# Patient Record
Sex: FEMALE | Race: WHITE
[De-identification: names, ages, dates, MRNs, and addresses within clinical notes are randomized per-mention and may not be internally consistent; named-entity substitution may affect disease eponyms.]

---

## 2020-06-02 ENCOUNTER — HOSPITAL ENCOUNTER (EMERGENCY)
Dept: HOSPITAL 56 - MW.ED | Age: 30
Discharge: HOME | End: 2020-06-02
Payer: SELF-PAY

## 2020-06-02 DIAGNOSIS — E03.9: ICD-10-CM

## 2020-06-02 DIAGNOSIS — R10.11: Primary | ICD-10-CM

## 2020-06-02 DIAGNOSIS — Z79.899: ICD-10-CM

## 2020-06-02 DIAGNOSIS — R42: ICD-10-CM

## 2020-06-02 DIAGNOSIS — F17.210: ICD-10-CM

## 2020-06-02 LAB
BUN SERPL-MCNC: 10 MG/DL (ref 7–18)
CHLORIDE SERPL-SCNC: 102 MMOL/L (ref 98–107)
CO2 SERPL-SCNC: 28.6 MMOL/L (ref 21–32)
GLUCOSE SERPL-MCNC: 85 MG/DL (ref 74–106)
LIPASE SERPL-CCNC: 89 U/L (ref 73–393)
POTASSIUM SERPL-SCNC: 3.8 MMOL/L (ref 3.5–5.1)
SODIUM SERPL-SCNC: 140 MMOL/L (ref 136–145)

## 2020-06-02 PROCEDURE — 96374 THER/PROPH/DIAG INJ IV PUSH: CPT

## 2020-06-02 PROCEDURE — 99284 EMERGENCY DEPT VISIT MOD MDM: CPT

## 2020-06-02 PROCEDURE — 96375 TX/PRO/DX INJ NEW DRUG ADDON: CPT

## 2020-06-02 PROCEDURE — 74176 CT ABD & PELVIS W/O CONTRAST: CPT

## 2020-06-02 PROCEDURE — 81003 URINALYSIS AUTO W/O SCOPE: CPT

## 2020-06-02 PROCEDURE — 76705 ECHO EXAM OF ABDOMEN: CPT

## 2020-06-02 PROCEDURE — 80053 COMPREHEN METABOLIC PANEL: CPT

## 2020-06-02 PROCEDURE — 81025 URINE PREGNANCY TEST: CPT

## 2020-06-02 PROCEDURE — 36415 COLL VENOUS BLD VENIPUNCTURE: CPT

## 2020-06-02 PROCEDURE — 83690 ASSAY OF LIPASE: CPT

## 2020-06-02 PROCEDURE — 85025 COMPLETE CBC W/AUTO DIFF WBC: CPT

## 2020-06-02 PROCEDURE — 96361 HYDRATE IV INFUSION ADD-ON: CPT

## 2020-06-02 NOTE — US
INDICATION:



Right upper quadrant pain. 



COMPARISON:



None available. 



TECHNIQUE:



Ultrasound examination of the right upper quadrant was performed. 



FINDINGS:



There is normal appearance of the gallbladder, with no sign of 

cholelithiasis or acute cholecystitis. There is no sign of gallbladder wall 

thickening or pericholecystic fluid. 



A sonographic Wood sign is not present. 



The common bile duct is normal in caliber at 3 mm. 



The pancreatic head and body were examined, and these are normal in 

appearance. 



The abdominal aorta and visualized portions of the inferior vena cava are 

normal in appearance.



The liver shows no sign of mass or contour abnormality, and there is no 

sign of ascites.



The right kidney is seen to have a small parapelvic cyst in the lower pole 

measuring 1.5 centimeters in diameter. 



IMPRESSION:



Normal right upper quadrant ultrasound aside from incidental finding of a 

simple parapelvic cyst in the right kidney.



Dictated by Kervin Barbosa MD @ Jun 2 2020  6:22PM



Signed by Dr. Kervin Barbosa @ Jun 2 2020  6:25PM

## 2020-06-02 NOTE — EDM.PDOC
<Samson Mera - Last Filed: 06/02/20 19:01>





ED HPI GENERAL MEDICAL PROBLEM





- General


Chief Complaint: General


Stated Complaint: DIZZINESS, RT SIDE PAIN


Time Seen by Provider: 06/02/20 16:51





- History of Present Illness


INITIAL COMMENTS - FREE TEXT/NARRATIVE: 





HISTORY AND PHYSICAL:





History of present illness:


This 29-year-old female with a past medical history of hypothyroidism, BMI 

greater than 40, presents emergency department complaining of right upper 

quadrant pain that she rates 5 out of 10.  Is in the right upper quadrant and 

radiates towards the back.  She does notice that is been worse after meals 

heavy in fat like fried chicken and pizza.  She has not had problems with her 

gallbladder in the past.  She denies any urinary symptoms.  No shortness of 

breath or chest pain.  No fever.  No hematemesis or hematochezia.  She has been 

nauseated and has vomited one time.  She feels generally weak and worn down.  

No other associated signs or symptoms.  No other modifying, aggravating or 

alleviating factors.





Review of systems: 


A 10-point review of systems, other than pertinent positives and negatives as 

stated per HPI, is otherwise negative.





Past medical history: 


As per history of present illness and as reviewed below otherwise 

noncontributory.





Surgical history: 


As per history of present illness and as reviewed below otherwise 

noncontributory.





Social history: 


No reported history of drug or alcohol abuse.





Family history: 


As per history of present illness and as reviewed below otherwise 

noncontributory.





Physical exam:


VITAL SIGNS:  Reviewed.


GENERAL: Mild distress.  Appears to be in acute pain


HEAD: No signs of head trauma.


EYES: Pupils are equal.  Extraocular motions intact.


EARS: Hearing grossly intact.


MOUTH: Oropharynx is normal.


NECK: No adenopathy, no JVD.   


CHEST: Chest with clear breath sounds bilaterally.  No wheezes, rales, or 

rhonchi.


CARDIAC: Regular rate and rhythm.  Normal S1 and S2, without murmurs, gallops, 

or rubs.


VASCULAR: Peripheral pulses normal and equal in all extremities.


ABDOMEN: Soft, tender right upper quadrant, no distention or rebound.  No CVA 

tenderness.


MUSCULOSKELETAL: Good range of motion of all major joints. Extremities without 

clubbing, cyanosis or edema.


NEUROLOGIC EXAM: Alert and oriented x 3.  No focal sensory or motor deficits.  

Speech normal.  Follows commands.


PSYCHIATRIC: Mood normal.


SKIN: No rash or lesions.








Initial Differential Diagnosis & Plan:





The differential diagnosis would include appendicitis, cholecystitis, 

pyelonephritis, pancreatitis, mesenteric infarction, diverticulitis, small 

bowel obstruction, volvulus, and ACS.





Suggestive of biliary or renal colic.  I will perform a bedside ultrasound to 

evaluate the gallbladder.  If this is negative I will order a CT and a formal 

ultrasound.  Will order labs to include CBC, CMP, UA, urine hCG, and a lipase.





Definitive disposition and diagnosis as appropriate pending reevaluation and 

review of above.








  ** RUQ


Pain Score (Numeric/FACES): 5





- Related Data


 Allergies











Allergy/AdvReac Type Severity Reaction Status Date / Time


 


No Known Allergies Allergy   Verified 06/02/20 17:09











Home Meds: 


 Home Meds





Levothyroxine 75 mcg PO DAILY 06/02/20 [History]


Venlafaxine [Effexor] 225 mg PO DAILY 06/02/20 [History]


traZODone HCl [Trazodone HCl] 150 mg PO BEDTIME 06/02/20 [History]











Past Medical History


Endocrine/Metabolic History: Reports: Hypothyroidism





- Past Surgical History


HEENT Surgical History: Reports: Tonsillectomy


Musculoskeletal Surgical History: Reports: Other (See Below)


Other Musculoskeletal Surgeries/Procedures:: Bilateral Hip Surgeries x3 each





Social & Family History





- Family History


Cardiac: Reports: Hypertension


Endocrine/Metabolic: Reports: Diabetes, type II





- Tobacco Use


Smoking Status *Q: Current Every Day Smoker


Years of Tobacco use: 3


Packs/Tins Daily: 0.5





- Recreational Drug Use


Recreational Drug Use: No





ED ROS GENERAL





- Review of Systems


Review Of Systems: Unable To Obtain (noted)


Reason Not Obtained: noted





ED EXAM, GENERAL





- Physical Exam


Exam: Not Obtained (see noted)





ED GENERAL MEDICAL PROCEDURES





- Additional/Other Procedure(s)


Other (Free Text) Procedure(s): 





Procedure: Limited Abdominal Ultrasound - Right Upper Quadrant 


Performed and interpreted by me; images recorded and archived


Indication: Right upper quadrant/epigastric pain/flank pain


Findings:


 -No gallstones


 -No pericholecystic fluid


 -Normal gallbladder wall thickness


 -Common Bile duct not adequately visualized


Interpretation: 


Normal right upper quadrant ultrasound


No evidence of gallstones


No evidence of cholecystitis


Signed by


Samson Mera M.D.








Procedure Note: Physician placed IV


Due to difficult or critical IV access situation I have placed an IV for 

treatment and circulatory access.


Location: 20-gauge angiocatheter right antecubital fossa


Complications: None


 











Course





- Vital Signs


Last Recorded V/S: 


 Last Vital Signs











Temp  36.6 C   06/02/20 19:11


 


Pulse  71   06/02/20 19:11


 


Resp  18   06/02/20 19:11


 


BP  127/70   06/02/20 19:11


 


Pulse Ox  99   06/02/20 19:11














- Orders/Labs/Meds


Labs: 


 Laboratory Tests











  06/02/20 06/02/20 06/02/20 Range/Units





  17:33 17:33 17:41 


 


WBC    11.77 H  (4.0-11.0)  K/uL


 


RBC    4.76  (4.30-5.90)  M/uL


 


Hgb    14.1  (12.0-16.0)  g/dL


 


Hct    43.2  (36.0-46.0)  %


 


MCV    90.8  (80.0-98.0)  fL


 


MCH    29.6  (27.0-32.0)  pg


 


MCHC    32.6  (31.0-37.0)  g/dL


 


RDW Std Deviation    43.8  (28.0-62.0)  fl


 


RDW Coeff of Kitty    13  (11.0-15.0)  %


 


Plt Count    350  (150-400)  K/uL


 


MPV    10.50  (7.40-12.00)  fL


 


Neut % (Auto)    63.4  (48.0-80.0)  %


 


Lymph % (Auto)    25.7  (16.0-40.0)  %


 


Mono % (Auto)    5.9  (0.0-15.0)  %


 


Eos % (Auto)    4.7  (0.0-7.0)  %


 


Baso % (Auto)    0.3  (0.0-1.5)  %


 


Neut # (Auto)    7.5 H  (1.4-5.7)  K/uL


 


Lymph # (Auto)    3.0 H  (0.6-2.4)  K/uL


 


Mono # (Auto)    0.7  (0.0-0.8)  K/uL


 


Eos # (Auto)    0.6  (0.0-0.7)  K/uL


 


Baso # (Auto)    0.0  (0.0-0.1)  K/uL


 


Nucleated RBC %    0.0  /100WBC


 


Nucleated RBCs #    0  K/uL


 


Sodium     (136-145)  mmol/L


 


Potassium     (3.5-5.1)  mmol/L


 


Chloride     ()  mmol/L


 


Carbon Dioxide     (21.0-32.0)  mmol/L


 


BUN     (7.0-18.0)  mg/dL


 


Creatinine     (0.6-1.0)  mg/dL


 


Est Cr Clr Drug Dosing     mL/min


 


Estimated GFR (MDRD)     ml/min


 


Glucose     ()  mg/dL


 


Calcium     (8.5-10.1)  mg/dL


 


Total Bilirubin     (0.2-1.0)  mg/dL


 


AST     (15-37)  IU/L


 


ALT     (14-63)  IU/L


 


Alkaline Phosphatase     ()  U/L


 


Total Protein     (6.4-8.2)  g/dL


 


Albumin     (3.4-5.0)  g/dL


 


Globulin     (2.6-4.0)  g/dL


 


Albumin/Globulin Ratio     (0.9-1.6)  


 


Lipase     ()  U/L


 


Urine Color  YELLOW    


 


Urine Appearance  HAZY    


 


Urine pH  5.0    (5.0-8.0)  


 


Ur Specific Gravity  >= 1.030    (1.001-1.035)  


 


Urine Protein  NEGATIVE    (NEGATIVE)  mg/dL


 


Urine Glucose (UA)  NEGATIVE    (NEGATIVE)  mg/dL


 


Urine Ketones  NEGATIVE    (NEGATIVE)  mg/dL


 


Urine Occult Blood  NEGATIVE    (NEGATIVE)  


 


Urine Nitrite  NEGATIVE    (NEGATIVE)  


 


Urine Bilirubin  NEGATIVE    (NEGATIVE)  


 


Urine Urobilinogen  0.2    (<2.0)  EU/dL


 


Ur Leukocyte Esterase  NEGATIVE    (NEGATIVE)  


 


Urine HCG, Qual   NEGATIVE   (NEGATIVE)  














  06/02/20 Range/Units





  17:41 


 


WBC   (4.0-11.0)  K/uL


 


RBC   (4.30-5.90)  M/uL


 


Hgb   (12.0-16.0)  g/dL


 


Hct   (36.0-46.0)  %


 


MCV   (80.0-98.0)  fL


 


MCH   (27.0-32.0)  pg


 


MCHC   (31.0-37.0)  g/dL


 


RDW Std Deviation   (28.0-62.0)  fl


 


RDW Coeff of Kitty   (11.0-15.0)  %


 


Plt Count   (150-400)  K/uL


 


MPV   (7.40-12.00)  fL


 


Neut % (Auto)   (48.0-80.0)  %


 


Lymph % (Auto)   (16.0-40.0)  %


 


Mono % (Auto)   (0.0-15.0)  %


 


Eos % (Auto)   (0.0-7.0)  %


 


Baso % (Auto)   (0.0-1.5)  %


 


Neut # (Auto)   (1.4-5.7)  K/uL


 


Lymph # (Auto)   (0.6-2.4)  K/uL


 


Mono # (Auto)   (0.0-0.8)  K/uL


 


Eos # (Auto)   (0.0-0.7)  K/uL


 


Baso # (Auto)   (0.0-0.1)  K/uL


 


Nucleated RBC %   /100WBC


 


Nucleated RBCs #   K/uL


 


Sodium  140  (136-145)  mmol/L


 


Potassium  3.8  (3.5-5.1)  mmol/L


 


Chloride  102  ()  mmol/L


 


Carbon Dioxide  28.6  (21.0-32.0)  mmol/L


 


BUN  10  (7.0-18.0)  mg/dL


 


Creatinine  0.7  (0.6-1.0)  mg/dL


 


Est Cr Clr Drug Dosing  107.67  mL/min


 


Estimated GFR (MDRD)  > 60.0  ml/min


 


Glucose  85  ()  mg/dL


 


Calcium  9.5  (8.5-10.1)  mg/dL


 


Total Bilirubin  0.3  (0.2-1.0)  mg/dL


 


AST  25  (15-37)  IU/L


 


ALT  34  (14-63)  IU/L


 


Alkaline Phosphatase  62  ()  U/L


 


Total Protein  7.3  (6.4-8.2)  g/dL


 


Albumin  3.9  (3.4-5.0)  g/dL


 


Globulin  3.4  (2.6-4.0)  g/dL


 


Albumin/Globulin Ratio  1.1  (0.9-1.6)  


 


Lipase  89  ()  U/L


 


Urine Color   


 


Urine Appearance   


 


Urine pH   (5.0-8.0)  


 


Ur Specific Gravity   (1.001-1.035)  


 


Urine Protein   (NEGATIVE)  mg/dL


 


Urine Glucose (UA)   (NEGATIVE)  mg/dL


 


Urine Ketones   (NEGATIVE)  mg/dL


 


Urine Occult Blood   (NEGATIVE)  


 


Urine Nitrite   (NEGATIVE)  


 


Urine Bilirubin   (NEGATIVE)  


 


Urine Urobilinogen   (<2.0)  EU/dL


 


Ur Leukocyte Esterase   (NEGATIVE)  


 


Urine HCG, Qual   (NEGATIVE)  











Meds: 


Medications














Discontinued Medications














Generic Name Dose Route Start Last Admin





  Trade Name Shayan  PRN Reason Stop Dose Admin


 


Acetaminophen  975 mg  06/02/20 17:14  06/02/20 17:40





  Tylenol  PO  06/02/20 17:15  975 mg





  NOW ONE   Administration





     





     





     





     


 


Sodium Chloride  1,000 mls @ 2,000 mls/hr  06/02/20 17:14  06/02/20 17:40





  Normal Saline  IV  06/02/20 17:43  2,000 mls/hr





  .Bolus ONE   Administration





     





     





     





     


 


Ketorolac Tromethamine  15 mg  06/02/20 17:14  06/02/20 17:40





  Toradol  IVPUSH  06/02/20 17:15  15 mg





  ONETIME ONE   Administration





     





     





     





     


 


Prochlorperazine Edisylate  10 mg  06/02/20 17:14  06/02/20 17:40





  Compazine  IVPUSH  06/02/20 17:15  10 mg





  ONETIME ONE   Administration





     





     





     





     














- Re-Assessments/Exams


Free Text/Narrative Re-Assessment/Exam: 





06/02/20 19:04


The patient is feeling much better at time of turnover to my colleague, Dr. Abernathy, who will follow up on the CT scan and reevaluate the patient.  The labs 

are essentially normal.  The right upper quadrant ultrasound shows a cyst on 

the kidney but not polycystic kidney disease.  There is no biliary pathology 

noted.  This is consistent with my bedside ultrasound done previously.  CT scan 

is pending.  I have reviewed the images and do not see clear evidence of 

obvious pathology but the radiologist is reading this currently.





My diagnostic impression:


1.  Abdominal pain unclear etiology symptoms resolved


2.  History of BMI greater than 45





I discussed the go home plan with the patient and for discharge I do not 

recommend any specific medications as we do not want amassed symptoms that of 

unclear etiology.  Dr. Abernathy will prescribe appropriate medications if there is 

underlying pathology that will be helped by medication management.











Departure





- Departure


Time of Disposition: 19:06


Disposition: Home, Self-Care 01


Clinical Impression: 


 Right upper quadrant abdominal pain








- Discharge Information


*PRESCRIPTION DRUG MONITORING PROGRAM REVIEWED*: Not Applicable


*COPY OF PRESCRIPTION DRUG MONITORING REPORT IN PATIENT CYNDEE: Not Applicable


Instructions:  Abdominal Pain, Adult, Easy-to-Read, Pain Without a Known Cause


Referrals: 


Grondahl,Ingrid L, NP [Primary Care Provider] - 1 Week (for follow-up of symptoms

)


Forms:  ED Department Discharge


Additional Instructions: 


Thank you for choosing the [facility] emergency department in 

[place] for your medical needs today.  It was a pleasure caring for you.





You were seen in the emergency department for abdominal pain.  Your blood work 

and CT scan were unremarkable.  This should be reassuring, as we do not see a 

dangerous or life-threatening explanation for your pain today.  I do recommend 

following up with your primary medical clinic in the next week or so for 

reevaluation if your symptoms do not subside.  I recommend over-the-counter 

acetaminophen and ibuprofen for pain in the meantime.  Come back to the 

emergency department immediately if your pain worsens, if you develop a fever 

of 100.4 or higher, repeated vomiting, vomiting blood, if you see blood in your 

bowel movements, or have any other new or concerning symptoms.





Please return the emergency department immediately if your symptoms worsen or 

if you feel worse.





**************************************************





The following information is given to patients seen in the emergency department 

who are being discharged. This information is to outline your options for follow

-up care. We provide all patients seen in our emergency department with a follow

-up referral.





The need for follow-up, as well as the timing and circumstances, are variable 

depending upon the specifics of your emergency department visit.





If you don't have a primary care physician on staff, we will provide you with a 

referral. We always advise you to contact your personal physician following an 

emergency department visit to inform them of the circumstance of the visit and 

for follow-up with them and/or the need for any referrals to a consulting 

specialist.





The emergency department will also refer you to a specialist when appropriate. 

This referral assures that you have the opportunity for follow-up care with a 

specialist. All of these measure are taken in an effort to provide you with 

optimal care, which includes your follow-up.





Under all circumstances we always encourage you to contact your private 

physician who remains a resource for coordinating your care. When calling for 

follow-up care, please make the office aware that this follow-up is from your 

recent emergency room visit. If for any reason you are refused follow-up, 

please contact the [facility] Emergency 

Department at [phone] and asked to speak to the emergency department 

charge nurse.





If you do not have a primary care physician that is caring for you, you can 

contact these clinics below to set up an appointment to establish care:





[facility]


[address] [place] [address]


Phone: [phone]


Fax: [phone]





[facility]


[address] [place] [address]


Phone: [phone]


Fax: [phone]





Sepsis Event Note





- Evaluation


Sepsis Screening Result: No Definite Risk





- Focused Exam


Vital Signs: 


 Vital Signs











  Temp Pulse Resp BP Pulse Ox


 


 06/02/20 19:11  36.6 C  71  18  127/70  99


 


 06/02/20 17:04  36.4 C  82  18  132/95 H  97











Date Exam was Performed: 06/02/20


Time Exam was Performed: 19:01





<Donny Abernathy - Last Filed: 06/02/20 19:26>





Course





- Vital Signs


Text/Narrative:: 


I assumed care of this patient at 19 and hours from Dr. Samson Mera.  29-year

-old female complaining of right upper quadrant abdominal pain, worse after 

eating fatty foods.  I reviewed the triage note along with vital signs and work-

up.





Vitally stable, afebrile.  CBC shows a very slight leukocytosis.  Electrolytes 

and renal function normal.  Hepatic markers and lipase are within normal 

limits.  Urinalysis is bland, no blood or evidence of infection.  Noncontrast 

CT scan of the abdomen/pelvis showed multiple small nonobstructive calculi in 

both kidneys, otherwise no acute findings.  Patient already received IV 

Compazine along with IV Toradol and Tylenol.





I personally reevaluated the patient at 1920.  Her most recent set of vital 

signs are reassuring. She is feeling much better after receiving medications.  

She has very minimal tenderness in the right upper quadrant, her abdomen is soft

, and she appears very comfortable. 





Given her negative work-up and well appearance, she is stable to discharge home 

with outpatient primary care follow-up.  I did recommend over-the-counter 

Tylenol/Motrin as needed for pain. Strict emergency department return 

precautions were provided, patient indicated understanding.  All questions were 

answered prior to departure.  Discharged in good condition.





Departure





- Departure


Time of Disposition: 19:23


Condition: Good





- Discharge Information


*PRESCRIPTION DRUG MONITORING PROGRAM REVIEWED*: Not Applicable


*COPY OF PRESCRIPTION DRUG MONITORING REPORT IN PATIENT CYNDEE: Not Applicable





Sepsis Event Note





- Focused Exam


Date Exam was Performed: 06/02/20


Time Exam was Performed: 19:24

## 2022-01-12 NOTE — CT
"[facility].    Discharge instructions, which include signs and symptoms to monitor patient for, as well as detailed information regarding upper respiratory tract infection and acute suppurative otitis media of both ears provided.  All questions and concerns addressed at this time.  Follow up visit with pediatrician encouraged.  Dr. Valenzuela's office contact information with phone number and address provided.  Prescription for OMNICEF provided to patient. Patient's mother advised that they will need to finish the entire course of antibiotics regardless if symptoms improve.  Patient's mother advised to stop taking medications if signs and symptoms of allergic reaction occur.  Patient's mother verbalizes understanding.  Children's Tylenol (160mg/5mL) / Children's Motrin (100mg/5mL) dosing sheet with the appropriate dose per the patient's current weight was highlighted and provided with discharge instructions.  Time when patient's next safe, weight-based dose can be administered highlighted.  Mother advised of signs and symptoms of dehydration, respiratory distress, and uncontrolled fevers while dosing with motrin and tylenol will need to return to the ER.  Mother advised of suctioning before meals and bedtime as well as using a cool mist humidifier to help improve symptoms.  Mother advised of washing hands well and wiping down surfaces to prevent further infection.  Mother advised to socially isolate until COVID results.  Mother verbalized understanding.    Patient leaves ER in no apparent distress. This RN provided education regarding returning to the ER for any new concerns or changes in patient's condition.      BP (!) (P) 124/79 Comment: ERP approved for discharge  Pulse 122   Temp (P) 37.7 °C (99.8 °F) (Rectal)   Resp (P) 35   Ht 0.737 m (2' 5\")   Wt 8.7 kg (19 lb 2.9 oz)   SpO2 98%   BMI 16.03 kg/m²     " INDICATION:



Right upper quadrant pain and flank pain. 



COMPARISON:



Ultrasound of the right upper quadrant from today. 



TECHNIQUE:



CT examination of the abdomen and pelvis was performed without contrast 

enhancement using 3 mm thick axial sections from the lung bases through the 

pubic symphysis. Oral contrast was not administered. 



Please note that all CT scans at this facility use dose modulation, 

iterative reconstruction, and/or weight-based dosing when appropriate to 

reduce radiation dose to as low as reasonably achievable. 



FINDINGS:



In the abdomen, the unenhanced liver, spleen, pancreas, and adrenals are 

normal in appearance. 



There are several small, nonobstructive calculi in both kidneys. The 

largest calculus on the right is in the posterior interpolar region, 

measuring 4 millimeters in diameter. The largest calculus on the left is in 

the anterior interpolar region, measuring 3 millimeters in diameter.



There is no sign of hydronephrosis or hydroureter. No ureteral calculi are 

evident.



The gallbladder is normal in appearance. 



The abdominal aorta is normal in caliber with no sign of dilatation. There 

is no sign of retroperitoneal mass or adenopathy. 



The stomach, loops of small bowel, and colon in the abdomen are normal in 

appearance. 



In the pelvis, the appendix is normal in appearance with no sign of 

inflammatory process.



The loops of small bowel and colon in the pelvis are normal in appearance.



The uterus and adnexal regions are normal in appearance. 



The urinary bladder is normal in appearance. 



There is no sign of pelvic or inguinal mass or adenopathy. 



There is no sign of free air or free fluid in the abdomen or pelvis. 



The lung bases are clear.



There is moderate disc degenerative disease at L4-5 and L5-S1. These are 

associated with moderate diffuse disc bulges with posterior osteophytic 

ridging, probably resulting in spinal stenosis.



Incidental note is made of osteochondromas arising from the greater and 

lesser trochanters of the left hip.



IMPRESSION:



CT of the abdomen shows multiple small nonobstructive calculi in both 

kidneys, with no sign of any hydronephrosis, hydroureter, or ureteral 

calculi. 



Normal CT of the pelvis without contrast.



Moderate disc degenerative disease at L4-5 and L5-S1 with moderate diffuse 

disc bulges and posterior osteophytic ridging, probably resulting in 

moderate spinal stenosis.



Please note that all CT scans at this facility use dose modulation, 

iterative reconstruction, and/or weight-based dosing when appropriate to 

reduce radiation dose to as low as reasonably achievable.



Dictated by Kervin Barbosa MD @ Jun 2 2020  6:58PM



Signed by Dr. Kervin Barbosa @ Jun 2 2020  7:09PM